# Patient Record
Sex: MALE
[De-identification: names, ages, dates, MRNs, and addresses within clinical notes are randomized per-mention and may not be internally consistent; named-entity substitution may affect disease eponyms.]

---

## 2020-09-18 ENCOUNTER — APPOINTMENT (OUTPATIENT)
Dept: OTOLARYNGOLOGY | Facility: CLINIC | Age: 50
End: 2020-09-18
Payer: COMMERCIAL

## 2020-09-18 VITALS — TEMPERATURE: 97.6 F

## 2020-09-18 DIAGNOSIS — R13.14 DYSPHAGIA, PHARYNGOESOPHAGEAL PHASE: ICD-10-CM

## 2020-09-18 DIAGNOSIS — K21.9 GASTRO-ESOPHAGEAL REFLUX DISEASE W/OUT ESOPHAGITIS: ICD-10-CM

## 2020-09-18 DIAGNOSIS — Z87.891 PERSONAL HISTORY OF NICOTINE DEPENDENCE: ICD-10-CM

## 2020-09-18 PROBLEM — Z00.00 ENCOUNTER FOR PREVENTIVE HEALTH EXAMINATION: Status: ACTIVE | Noted: 2020-09-18

## 2020-09-18 PROCEDURE — 99203 OFFICE O/P NEW LOW 30 MIN: CPT | Mod: 25

## 2020-09-18 PROCEDURE — 31575 DIAGNOSTIC LARYNGOSCOPY: CPT

## 2020-09-18 RX ORDER — OMEPRAZOLE 20 MG/1
20 CAPSULE, DELAYED RELEASE ORAL
Refills: 0 | Status: ACTIVE | COMMUNITY

## 2020-09-18 NOTE — ASSESSMENT
[FreeTextEntry1] : He has a history of dysphagia and globus sensation. It has been getting more frequent. It may be due to reflux. It sounds like he had an upper endoscopy in January. Flexible laryngoscopy was unremarkable. \par \par PLAN\par \par -findings and management options discussed in detail with the patient. \par - Reflux precautions.  Literature given.\par - He will continue the omeprazole.  He may add pepcid at night if needed\par - I am sending him for a modified barium swallow\par - follow up after the test\par - he should call and return earlier if any concerns

## 2020-09-18 NOTE — HISTORY OF PRESENT ILLNESS
[de-identified] : MILLI VENTURA is a 50 year patient seen in consultation for dysphagia. He was referred by Dr. Delgado.  He said that he usually gets an episode of dysphagia about once a month. It has been becoming more frequent. He has difficulty breathing when it occurs and he will regurgitate undigested food. He has difficulty swallowing both liquids and solids. He has no throat pain, voice change, or bleeding. He may have had an upper endoscopy in January. He said that it was okay. He started omeprazole yesterday.

## 2020-09-18 NOTE — CONSULT LETTER
[Dear  ___] : Dear  [unfilled], [Consult Letter:] : I had the pleasure of evaluating your patient, [unfilled]. [Please see my note below.] : Please see my note below. [Consult Closing:] : Thank you very much for allowing me to participate in the care of this patient.  If you have any questions, please do not hesitate to contact me. [Sincerely,] : Sincerely, [FreeTextEntry3] : Yi Gómez MD\par